# Patient Record
Sex: FEMALE | Race: WHITE | NOT HISPANIC OR LATINO | Employment: FULL TIME | URBAN - METROPOLITAN AREA
[De-identification: names, ages, dates, MRNs, and addresses within clinical notes are randomized per-mention and may not be internally consistent; named-entity substitution may affect disease eponyms.]

---

## 2021-03-26 ENCOUNTER — TRANSFERRED RECORDS (OUTPATIENT)
Dept: HEALTH INFORMATION MANAGEMENT | Facility: CLINIC | Age: 51
End: 2021-03-26

## 2021-03-26 ENCOUNTER — TELEPHONE (OUTPATIENT)
Dept: ORTHOPEDICS | Facility: CLINIC | Age: 51
End: 2021-03-26

## 2021-03-26 NOTE — TELEPHONE ENCOUNTER
[facility] Call [facility]    Phone Message    May a detailed message be left on voicemail: yes     Reason for Call: Other: Patient is being referred to see  for a tumor appt. Dr. Mikayla Coelho from [facility] is getting records sent over. But patient is requesting a call back to set up an appt with .      Action Taken: Other: ORTHO    Travel Screening: Not Applicable

## 2021-03-30 ENCOUNTER — DOCUMENTATION ONLY (OUTPATIENT)
Dept: CARE COORDINATION | Facility: CLINIC | Age: 51
End: 2021-03-30

## 2021-03-30 NOTE — TELEPHONE ENCOUNTER
RECORDS RECEIVED FROM: Paraspinal Mass Referring: Cassy Coelho @ [facility]   Walked into clinic to schedule  *Records w/Sofy HERRERA CMA in Clinic/CE*  *MRI in PACS, Need Chest/Abd./Pelvis CT completed on 02/07/2021 @ [facility]*   DATE RECEIVED: Apr 1, 2021   NOTES STATUS DETAILS   OFFICE NOTE from referring provider Received    OFFICE NOTE from other specialist N/A    DISCHARGE SUMMARY from hospital N/A    DISCHARGE REPORT from the ER N/A    OPERATIVE REPORT N/A    MEDICATION LIST Internal    IMPLANT RECORD/STICKER N/A    LABS     CBC/DIFF N/A    CULTURES N/A    INJECTIONS DONE IN RADIOLOGY N/A    MRI Received    CT SCAN In process    XRAYS (IMAGES & REPORTS) In process    TUMOR     PATHOLOGY  Slides & report N/A    03/30/21   11:40 AM   RESOLVED IMAGE IN PACS  COMPLETE  Cailin Santa CMA    03/30/21   10:51 AM   CALLED YOHANNES FOR CT  Cailin Santa CMA

## 2021-03-30 NOTE — TELEPHONE ENCOUNTER
Reached out to patient on 03/29/2021 after she came to the clinic to schedule an appointment for Ortho Onc. Consult. I apologized that I was unable to meet with her when she came to the clinic and explained that I had just finished a busy clinic. She verbalized understanding and was just happy to hear back about moving forward with an appointment after being referred for a Paraspinal Mass. She verbalized that she is very anxious and wanted to make sure that we received all of her records/imaging from the referring. I explained that I could see everything in CE, and that I had also printed the imaging reports. I assured her that her MRI was here and that the only other piece of information we needed was her CAP CT done on 02/07/2021, but that our records team will take care of getting it. She was very relieved. When she came to the clinic she was scheduled for Monday, 04/05/2021 w/Dr. Mao. She asked if there was anything sooner. I offered her an appointment with Dr. Gu on Thursday, 04/01/2021 @ 2pm. She decided to keep her original appointment, but to be seen by Dr. Gu first so that she could be seen ASAP. She had no further questions or concerns and thanked me for my help.

## 2021-04-01 ENCOUNTER — PRE VISIT (OUTPATIENT)
Dept: ORTHOPEDICS | Facility: CLINIC | Age: 51
End: 2021-04-01

## 2021-04-01 ENCOUNTER — OFFICE VISIT (OUTPATIENT)
Dept: ORTHOPEDICS | Facility: CLINIC | Age: 51
End: 2021-04-01
Payer: COMMERCIAL

## 2021-04-01 DIAGNOSIS — M72.9 FIBROMATOSIS: Primary | ICD-10-CM

## 2021-04-01 PROCEDURE — 99203 OFFICE O/P NEW LOW 30 MIN: CPT | Performed by: ORTHOPAEDIC SURGERY

## 2021-04-01 RX ORDER — OXYBUTYNIN CHLORIDE 5 MG/1
5 TABLET, EXTENDED RELEASE ORAL
COMMUNITY
Start: 2019-11-18

## 2021-04-01 RX ORDER — HYDROXYZINE HYDROCHLORIDE 10 MG/1
10-20 TABLET, FILM COATED ORAL
COMMUNITY
Start: 2020-09-23

## 2021-04-01 RX ORDER — DIPHENOXYLATE HYDROCHLORIDE AND ATROPINE SULFATE 2.5; .025 MG/1; MG/1
1 TABLET ORAL
COMMUNITY

## 2021-04-01 RX ORDER — FLUOXETINE 10 MG/1
10 CAPSULE ORAL
COMMUNITY
Start: 2020-03-05

## 2021-04-01 NOTE — LETTER
4/1/2021         RE: Maday Yuan  [address]  [place] [facility] [address]        Dear Colleague,    Thank you for referring your patient, Maday Yuan, to the [facility] [place]. Please see a copy of my visit note below.    Chief complaint right lumbar paraspinal mass.    Patient is a 50-year-old female who was seen in the emergency room in early February.  This was for abdominal symptoms in the abdominal CT was obtained.  A mass in the right lumbar paraspinal region was identified.  This was ultimately followed up with an MRI scan which characterize the mass has been 3.8 x 2.7 x 14 cm.  There was some impression by the radiologist that it had enlarged since the February CT scan.    Her past medical history and medications are not contributing to this evaluation and are listed in her electronic record.    On physical exam examination of the paraspinal region on the right both while standing and while prone does not elicit an obvious mass.    Review of the CT scan and the MRI scan confirmed the finding of a paraspinal mass.  This is not diagnosable by MRI.  It is very infiltrating the paraspinal muscles and to my review may represent a fibromatosis lesion.    I reviewed with the patient the need for biopsy.  I feel an open biopsy would be best because it still it is difficult to identify the tumor on physical examination which makes a Ke-Cut biopsy is less reliable.    All her questions were answered and she would like to proceed with an open biopsy.    Impression: Right lumbar paraspinal mass which warrants biopsy.  Open biopsy recommended.    Plan: 1.  Kenya will contact the patient to arrange for the biopsy.  2.  The case request form has been completed.    Significant time was spent reviewing both the MRI scan and the CT scan myself.  The patient and her  had many questions.  Total length of visit was 35 minutes including my review of imaging prior to and during the  visit.        Malick Gu MD

## 2021-04-01 NOTE — NURSING NOTE
Chief Complaint   Patient presents with     Consult     consulting paraspinal mass referred by Cassy Coelho NP at [facility]        50 year old  1970    There were no vitals taken for this visit.            Pain Assessment  Patient Currently in Pain: Yes  0-10 Pain Scale: 3  Primary Pain Location: Back(lower back)             [id number] - [place], [place] - [address]        Allergies   Allergen Reactions     Cyclobenzaprine Other (See Comments)     Makes her hyper  Makes her hyper             Current Outpatient Medications   Medication     FLUoxetine (PROZAC) 10 MG capsule     FLUoxetine (PROZAC) 20 MG capsule     hydrOXYzine (ATARAX) 10 MG tablet     melatonin 1 MG TABS tablet     Multiple Vitamin (MULTI-VITAMINS) TABS     oxybutynin ER (DITROPAN-XL) 5 MG 24 hr tablet     No current facility-administered medications for this visit.

## 2021-04-02 ENCOUNTER — PREP FOR PROCEDURE (OUTPATIENT)
Dept: ORTHOPEDICS | Facility: CLINIC | Age: 51
End: 2021-04-02

## 2021-04-02 DIAGNOSIS — M72.9 FIBROMATOSIS: Primary | ICD-10-CM

## 2021-04-02 NOTE — PROGRESS NOTES
Chief complaint right lumbar paraspinal mass.    Patient is a 50-year-old female who was seen in the emergency room in early February.  This was for abdominal symptoms in the abdominal CT was obtained.  A mass in the right lumbar paraspinal region was identified.  This was ultimately followed up with an MRI scan which characterize the mass has been 3.8 x 2.7 x 14 cm.  There was some impression by the radiologist that it had enlarged since the February CT scan.    Her past medical history and medications are not contributing to this evaluation and are listed in her electronic record.    On physical exam examination of the paraspinal region on the right both while standing and while prone does not elicit an obvious mass.    Review of the CT scan and the MRI scan confirmed the finding of a paraspinal mass.  This is not diagnosable by MRI.  It is very infiltrating the paraspinal muscles and to my review may represent a fibromatosis lesion.    I reviewed with the patient the need for biopsy.  I feel an open biopsy would be best because it still it is difficult to identify the tumor on physical examination which makes a Ke-Cut biopsy is less reliable.    All her questions were answered and she would like to proceed with an open biopsy.    Impression: Right lumbar paraspinal mass which warrants biopsy.  Open biopsy recommended.    Plan: 1.  Kenya will contact the patient to arrange for the biopsy.  2.  The case request form has been completed.    Significant time was spent reviewing both the MRI scan and the CT scan myself.  The patient and her  had many questions.  Total length of visit was 35 minutes including my review of imaging prior to and during the visit.

## 2021-04-05 ENCOUNTER — HOSPITAL ENCOUNTER (OUTPATIENT)
Facility: AMBULATORY SURGERY CENTER | Age: 51
End: 2021-04-05
Attending: ORTHOPAEDIC SURGERY
Payer: COMMERCIAL

## 2021-04-05 ENCOUNTER — TELEPHONE (OUTPATIENT)
Dept: ORTHOPEDICS | Facility: CLINIC | Age: 51
End: 2021-04-05

## 2021-04-05 DIAGNOSIS — M72.9 FIBROMATOSIS: ICD-10-CM

## 2021-04-05 NOTE — TELEPHONE ENCOUNTER
Patient is scheduled for surgery with Dr. Gu    Spoke with: Spoke with Maday    Date of Surgery: 4/23/21    Location: [facility]    Informed patient they will need an adult  Yes    Pre-op with surgeon (if applicable): Complete    H&P: Patient to schedule with PCP    Pre-procedure COVID-19 Test: Patient will await call from covid scheduling team    Additional imaging/appointments: post ops made    Surgery packet: mailed 4/5/21     Additional comments: Patient will await call from pre op nurses 1-2 days prior to surgery for arrival time

## 2021-04-05 NOTE — TELEPHONE ENCOUNTER
RN returned all to Maday.  She states that she is scared that Dr. Gu does not know what this is.  She is scared to wait 2 weeks, being a tier 1 surgery.  She is asking for a sooner date.  She also asked about Dr. Mao as well as that is who she was originally scheduled with and then was able to get in earlier with Dr. Gu.  RN assured her that Tier 1 2 weeks was an acceptable time frame for surgery.  She needed to get her H&P scheduled as well for surgery.  RN will review with MD the OR timing and get back to her tomorrow.  She knows that it will be after 5 pm probably before I call.  She states that she is just nervous .

## 2021-04-05 NOTE — TELEPHONE ENCOUNTER
[facility] Call [facility]    Phone Message    May a detailed message be left on voicemail: yes     Reason for Call: Other: Pt would like a call back to schedule biospy as ordered by Dr. Gu (4/2). She would like to schedule as soon as possible     Action Taken: Message routed to:  [facility]o    Travel Screening: Not Applicable

## 2021-04-06 ENCOUNTER — TRANSFERRED RECORDS (OUTPATIENT)
Dept: HEALTH INFORMATION MANAGEMENT | Facility: CLINIC | Age: 51
End: 2021-04-06

## 2021-04-06 ENCOUNTER — TELEPHONE (OUTPATIENT)
Dept: ORTHOPEDICS | Facility: CLINIC | Age: 51
End: 2021-04-06

## 2021-04-06 NOTE — TELEPHONE ENCOUNTER
[facility] Call [facility]    Phone Message    May a detailed message be left on voicemail: yes     Reason for Call: Other: Patient is scheduled for a biopsy with Dr. Gu on 4/23/21 and Pratima wanted to know if patient can be seen sooner with another provider who would be appropriate for this?      Please call her to discuss.    Action Taken: Message routed to:  [facility] ortho    Travel Screening: Not Applicable

## 2021-04-07 ENCOUNTER — TELEPHONE (OUTPATIENT)
Dept: ORTHOPEDICS | Facility: CLINIC | Age: 51
End: 2021-04-07

## 2021-04-07 DIAGNOSIS — Z11.59 ENCOUNTER FOR SCREENING FOR OTHER VIRAL DISEASES: ICD-10-CM

## 2021-04-07 RX ORDER — CEFAZOLIN SODIUM 2 G/50ML
2 SOLUTION INTRAVENOUS
Status: CANCELLED | OUTPATIENT
Start: 2021-04-07

## 2021-04-07 RX ORDER — CEFAZOLIN SODIUM 2 G/50ML
2 SOLUTION INTRAVENOUS SEE ADMIN INSTRUCTIONS
Status: CANCELLED | OUTPATIENT
Start: 2021-04-07

## 2021-04-07 NOTE — TELEPHONE ENCOUNTER
RN reviewed with patient the surgery packet and answered all her questions.  She had her H&P yesterday.  She is aware of covid and PAN calls coming yet.  RN discussed showering and soap, capnography,.  She is going to have her spouse Bill take care of her after surgery and drive her.  Medications were discussed with PCP and H&P.  RN states that 7-10 days is the normal turn around time for pathology.  Post op appt already on the books.

## 2021-04-07 NOTE — TELEPHONE ENCOUNTER
----- Message from Hailey Newsome sent at 4/7/2021  8:15 AM CDT -----  Regarding: RE: Surgery biopsy  I have her moved!  ----- Message -----  From: Malick Gu MD  Sent: 4/6/2021   5:31 PM CDT  To: Kenya Jacques RN, Hailey Newsome  Subject: Surgery biopsy                                   I just spoke with Maday.  Please schedule her for April 19 at [facility].  Thank you

## 2021-04-13 ENCOUNTER — ANESTHESIA EVENT (OUTPATIENT)
Dept: SURGERY | Facility: CLINIC | Age: 51
End: 2021-04-13
Payer: COMMERCIAL

## 2021-04-15 DIAGNOSIS — Z11.59 ENCOUNTER FOR SCREENING FOR OTHER VIRAL DISEASES: ICD-10-CM

## 2021-04-15 LAB
SARS-COV-2 RNA RESP QL NAA+PROBE: NORMAL
SPECIMEN SOURCE: NORMAL

## 2021-04-15 PROCEDURE — U0005 INFEC AGEN DETEC AMPLI PROBE: HCPCS | Performed by: ORTHOPAEDIC SURGERY

## 2021-04-15 PROCEDURE — U0003 INFECTIOUS AGENT DETECTION BY NUCLEIC ACID (DNA OR RNA); SEVERE ACUTE RESPIRATORY SYNDROME CORONAVIRUS 2 (SARS-COV-2) (CORONAVIRUS DISEASE [COVID-19]), AMPLIFIED PROBE TECHNIQUE, MAKING USE OF HIGH THROUGHPUT TECHNOLOGIES AS DESCRIBED BY CMS-2020-01-R: HCPCS | Performed by: ORTHOPAEDIC SURGERY

## 2021-04-16 LAB
LABORATORY COMMENT REPORT: NORMAL
SARS-COV-2 RNA RESP QL NAA+PROBE: NEGATIVE
SPECIMEN SOURCE: NORMAL

## 2021-04-19 ENCOUNTER — ANESTHESIA (OUTPATIENT)
Dept: SURGERY | Facility: CLINIC | Age: 51
End: 2021-04-19
Payer: COMMERCIAL

## 2021-04-19 ENCOUNTER — HOSPITAL ENCOUNTER (OUTPATIENT)
Facility: CLINIC | Age: 51
Discharge: HOME | End: 2021-04-19
Attending: ORTHOPAEDIC SURGERY | Admitting: ORTHOPAEDIC SURGERY
Payer: COMMERCIAL

## 2021-04-19 VITALS
HEIGHT: 66 IN | DIASTOLIC BLOOD PRESSURE: 78 MMHG | TEMPERATURE: 98.2 F | WEIGHT: 181.22 LBS | HEART RATE: 70 BPM | OXYGEN SATURATION: 97 % | SYSTOLIC BLOOD PRESSURE: 115 MMHG | RESPIRATION RATE: 16 BRPM | BODY MASS INDEX: 29.12 KG/M2

## 2021-04-19 DIAGNOSIS — M72.9 FIBROMATOSIS: ICD-10-CM

## 2021-04-19 LAB — GLUCOSE BLDC GLUCOMTR-MCNC: 97 MG/DL (ref 70–99)

## 2021-04-19 PROCEDURE — 250N000013 HC RX MED GY IP 250 OP 250 PS 637: Performed by: PHYSICIAN ASSISTANT

## 2021-04-19 PROCEDURE — 370N000017 HC ANESTHESIA TECHNICAL FEE, PER MIN: Performed by: ORTHOPAEDIC SURGERY

## 2021-04-19 PROCEDURE — 250N000013 HC RX MED GY IP 250 OP 250 PS 637: Performed by: ANESTHESIOLOGY

## 2021-04-19 PROCEDURE — 250N000025 HC SEVOFLURANE, PER MIN: Performed by: ORTHOPAEDIC SURGERY

## 2021-04-19 PROCEDURE — 250N000011 HC RX IP 250 OP 636: Performed by: NURSE ANESTHETIST, CERTIFIED REGISTERED

## 2021-04-19 PROCEDURE — 250N000009 HC RX 250: Performed by: NURSE ANESTHETIST, CERTIFIED REGISTERED

## 2021-04-19 PROCEDURE — 88307 TISSUE EXAM BY PATHOLOGIST: CPT | Mod: 26 | Performed by: PATHOLOGY

## 2021-04-19 PROCEDURE — 88307 TISSUE EXAM BY PATHOLOGIST: CPT | Mod: TC | Performed by: ORTHOPAEDIC SURGERY

## 2021-04-19 PROCEDURE — 250N000011 HC RX IP 250 OP 636: Performed by: PHYSICIAN ASSISTANT

## 2021-04-19 PROCEDURE — 710N000010 HC RECOVERY PHASE 1, LEVEL 2, PER MIN: Performed by: ORTHOPAEDIC SURGERY

## 2021-04-19 PROCEDURE — 82962 GLUCOSE BLOOD TEST: CPT

## 2021-04-19 PROCEDURE — 710N000012 HC RECOVERY PHASE 2, PER MINUTE: Performed by: ORTHOPAEDIC SURGERY

## 2021-04-19 PROCEDURE — 360N000077 HC SURGERY LEVEL 4, PER MIN: Performed by: ORTHOPAEDIC SURGERY

## 2021-04-19 PROCEDURE — 272N000001 HC OR GENERAL SUPPLY STERILE: Performed by: ORTHOPAEDIC SURGERY

## 2021-04-19 PROCEDURE — 258N000003 HC RX IP 258 OP 636: Performed by: ANESTHESIOLOGY

## 2021-04-19 PROCEDURE — 999N000141 HC STATISTIC PRE-PROCEDURE NURSING ASSESSMENT: Performed by: ORTHOPAEDIC SURGERY

## 2021-04-19 PROCEDURE — 250N000009 HC RX 250: Performed by: ORTHOPAEDIC SURGERY

## 2021-04-19 PROCEDURE — 250N000011 HC RX IP 250 OP 636: Performed by: ANESTHESIOLOGY

## 2021-04-19 RX ORDER — ONDANSETRON 4 MG/1
4 TABLET, ORALLY DISINTEGRATING ORAL EVERY 30 MIN PRN
Status: DISCONTINUED | OUTPATIENT
Start: 2021-04-19 | End: 2021-04-19 | Stop reason: HOSPADM

## 2021-04-19 RX ORDER — ACETAMINOPHEN 500 MG
1000 TABLET ORAL ONCE
Status: COMPLETED | OUTPATIENT
Start: 2021-04-19 | End: 2021-04-19

## 2021-04-19 RX ORDER — CEFAZOLIN SODIUM 2 G/100ML
2 INJECTION, SOLUTION INTRAVENOUS SEE ADMIN INSTRUCTIONS
Status: DISCONTINUED | OUTPATIENT
Start: 2021-04-19 | End: 2021-04-19 | Stop reason: HOSPADM

## 2021-04-19 RX ORDER — ONDANSETRON 2 MG/ML
4 INJECTION INTRAMUSCULAR; INTRAVENOUS EVERY 30 MIN PRN
Status: DISCONTINUED | OUTPATIENT
Start: 2021-04-19 | End: 2021-04-19 | Stop reason: HOSPADM

## 2021-04-19 RX ORDER — FENTANYL CITRATE 50 UG/ML
25-50 INJECTION, SOLUTION INTRAMUSCULAR; INTRAVENOUS
Status: DISCONTINUED | OUTPATIENT
Start: 2021-04-19 | End: 2021-04-19 | Stop reason: HOSPADM

## 2021-04-19 RX ORDER — MEPERIDINE HYDROCHLORIDE 25 MG/ML
12.5 INJECTION INTRAMUSCULAR; INTRAVENOUS; SUBCUTANEOUS
Status: COMPLETED | OUTPATIENT
Start: 2021-04-19 | End: 2021-04-19

## 2021-04-19 RX ORDER — OXYCODONE HYDROCHLORIDE 5 MG/1
5 TABLET ORAL
Status: COMPLETED | OUTPATIENT
Start: 2021-04-19 | End: 2021-04-19

## 2021-04-19 RX ORDER — AMOXICILLIN 250 MG
1-2 CAPSULE ORAL 2 TIMES DAILY
Qty: 30 TABLET | Refills: 0 | Status: SHIPPED | OUTPATIENT
Start: 2021-04-19

## 2021-04-19 RX ORDER — HYDROMORPHONE HYDROCHLORIDE 1 MG/ML
.3-.5 INJECTION, SOLUTION INTRAMUSCULAR; INTRAVENOUS; SUBCUTANEOUS EVERY 5 MIN PRN
Status: DISCONTINUED | OUTPATIENT
Start: 2021-04-19 | End: 2021-04-19 | Stop reason: HOSPADM

## 2021-04-19 RX ORDER — ONDANSETRON 4 MG/1
4 TABLET, ORALLY DISINTEGRATING ORAL
Status: DISCONTINUED | OUTPATIENT
Start: 2021-04-19 | End: 2021-04-19 | Stop reason: HOSPADM

## 2021-04-19 RX ORDER — MAGNESIUM HYDROXIDE 1200 MG/15ML
LIQUID ORAL PRN
Status: DISCONTINUED | OUTPATIENT
Start: 2021-04-19 | End: 2021-04-19 | Stop reason: HOSPADM

## 2021-04-19 RX ORDER — SODIUM CHLORIDE, SODIUM LACTATE, POTASSIUM CHLORIDE, CALCIUM CHLORIDE 600; 310; 30; 20 MG/100ML; MG/100ML; MG/100ML; MG/100ML
INJECTION, SOLUTION INTRAVENOUS CONTINUOUS
Status: DISCONTINUED | OUTPATIENT
Start: 2021-04-19 | End: 2021-04-19

## 2021-04-19 RX ORDER — NALOXONE HYDROCHLORIDE 0.4 MG/ML
0.4 INJECTION, SOLUTION INTRAMUSCULAR; INTRAVENOUS; SUBCUTANEOUS
Status: DISCONTINUED | OUTPATIENT
Start: 2021-04-19 | End: 2021-04-19

## 2021-04-19 RX ORDER — ONDANSETRON 2 MG/ML
4 INJECTION INTRAMUSCULAR; INTRAVENOUS EVERY 30 MIN PRN
Status: DISCONTINUED | OUTPATIENT
Start: 2021-04-19 | End: 2021-04-19

## 2021-04-19 RX ORDER — HYDROMORPHONE HYDROCHLORIDE 1 MG/ML
0.2 INJECTION, SOLUTION INTRAMUSCULAR; INTRAVENOUS; SUBCUTANEOUS EVERY 5 MIN PRN
Status: DISCONTINUED | OUTPATIENT
Start: 2021-04-19 | End: 2021-04-19 | Stop reason: HOSPADM

## 2021-04-19 RX ORDER — LABETALOL HYDROCHLORIDE 5 MG/ML
5 INJECTION, SOLUTION INTRAVENOUS
Status: DISCONTINUED | OUTPATIENT
Start: 2021-04-19 | End: 2021-04-19 | Stop reason: HOSPADM

## 2021-04-19 RX ORDER — LIDOCAINE HYDROCHLORIDE 20 MG/ML
INJECTION, SOLUTION INFILTRATION; PERINEURAL PRN
Status: DISCONTINUED | OUTPATIENT
Start: 2021-04-19 | End: 2021-04-19

## 2021-04-19 RX ORDER — SODIUM CHLORIDE, SODIUM LACTATE, POTASSIUM CHLORIDE, CALCIUM CHLORIDE 600; 310; 30; 20 MG/100ML; MG/100ML; MG/100ML; MG/100ML
INJECTION, SOLUTION INTRAVENOUS CONTINUOUS
Status: DISCONTINUED | OUTPATIENT
Start: 2021-04-19 | End: 2021-04-19 | Stop reason: HOSPADM

## 2021-04-19 RX ORDER — ACETAMINOPHEN 325 MG/1
650 TABLET ORAL
Status: DISCONTINUED | OUTPATIENT
Start: 2021-04-19 | End: 2021-04-19 | Stop reason: HOSPADM

## 2021-04-19 RX ORDER — NALOXONE HYDROCHLORIDE 0.4 MG/ML
0.2 INJECTION, SOLUTION INTRAMUSCULAR; INTRAVENOUS; SUBCUTANEOUS
Status: DISCONTINUED | OUTPATIENT
Start: 2021-04-19 | End: 2021-04-19 | Stop reason: HOSPADM

## 2021-04-19 RX ORDER — NALOXONE HYDROCHLORIDE 0.4 MG/ML
0.2 INJECTION, SOLUTION INTRAMUSCULAR; INTRAVENOUS; SUBCUTANEOUS
Status: DISCONTINUED | OUTPATIENT
Start: 2021-04-19 | End: 2021-04-19

## 2021-04-19 RX ORDER — FENTANYL CITRATE 50 UG/ML
INJECTION, SOLUTION INTRAMUSCULAR; INTRAVENOUS PRN
Status: DISCONTINUED | OUTPATIENT
Start: 2021-04-19 | End: 2021-04-19

## 2021-04-19 RX ORDER — ONDANSETRON 4 MG/1
4 TABLET, ORALLY DISINTEGRATING ORAL EVERY 30 MIN PRN
Status: DISCONTINUED | OUTPATIENT
Start: 2021-04-19 | End: 2021-04-19

## 2021-04-19 RX ORDER — NALOXONE HYDROCHLORIDE 0.4 MG/ML
0.4 INJECTION, SOLUTION INTRAMUSCULAR; INTRAVENOUS; SUBCUTANEOUS
Status: DISCONTINUED | OUTPATIENT
Start: 2021-04-19 | End: 2021-04-19 | Stop reason: HOSPADM

## 2021-04-19 RX ORDER — OXYCODONE HYDROCHLORIDE 5 MG/1
5 TABLET ORAL EVERY 4 HOURS PRN
Status: DISCONTINUED | OUTPATIENT
Start: 2021-04-19 | End: 2021-04-19 | Stop reason: HOSPADM

## 2021-04-19 RX ORDER — CEFAZOLIN SODIUM 2 G/100ML
2 INJECTION, SOLUTION INTRAVENOUS
Status: COMPLETED | OUTPATIENT
Start: 2021-04-19 | End: 2021-04-19

## 2021-04-19 RX ORDER — LIDOCAINE 40 MG/G
CREAM TOPICAL
Status: DISCONTINUED | OUTPATIENT
Start: 2021-04-19 | End: 2021-04-19 | Stop reason: HOSPADM

## 2021-04-19 RX ORDER — BUPIVACAINE HYDROCHLORIDE AND EPINEPHRINE 5; 5 MG/ML; UG/ML
INJECTION, SOLUTION PERINEURAL PRN
Status: DISCONTINUED | OUTPATIENT
Start: 2021-04-19 | End: 2021-04-19 | Stop reason: HOSPADM

## 2021-04-19 RX ORDER — PROPOFOL 10 MG/ML
INJECTION, EMULSION INTRAVENOUS PRN
Status: DISCONTINUED | OUTPATIENT
Start: 2021-04-19 | End: 2021-04-19

## 2021-04-19 RX ORDER — ONDANSETRON 2 MG/ML
INJECTION INTRAMUSCULAR; INTRAVENOUS PRN
Status: DISCONTINUED | OUTPATIENT
Start: 2021-04-19 | End: 2021-04-19

## 2021-04-19 RX ORDER — OXYCODONE HYDROCHLORIDE 5 MG/1
5-10 TABLET ORAL EVERY 4 HOURS PRN
Qty: 10 TABLET | Refills: 0 | Status: SHIPPED | OUTPATIENT
Start: 2021-04-19 | End: 2021-04-20

## 2021-04-19 RX ORDER — KETOROLAC TROMETHAMINE 30 MG/ML
INJECTION, SOLUTION INTRAMUSCULAR; INTRAVENOUS PRN
Status: DISCONTINUED | OUTPATIENT
Start: 2021-04-19 | End: 2021-04-19

## 2021-04-19 RX ADMIN — ROCURONIUM BROMIDE 50 MG: 10 INJECTION INTRAVENOUS at 12:35

## 2021-04-19 RX ADMIN — OXYCODONE HYDROCHLORIDE 5 MG: 5 TABLET ORAL at 15:33

## 2021-04-19 RX ADMIN — SODIUM CHLORIDE, POTASSIUM CHLORIDE, SODIUM LACTATE AND CALCIUM CHLORIDE: 600; 310; 30; 20 INJECTION, SOLUTION INTRAVENOUS at 12:26

## 2021-04-19 RX ADMIN — CEFAZOLIN 2 G: 10 INJECTION, POWDER, FOR SOLUTION INTRAVENOUS at 12:49

## 2021-04-19 RX ADMIN — MEPERIDINE HYDROCHLORIDE 12.5 MG: 25 INJECTION INTRAMUSCULAR; INTRAVENOUS; SUBCUTANEOUS at 14:02

## 2021-04-19 RX ADMIN — SUGAMMADEX 200 MG: 100 INJECTION, SOLUTION INTRAVENOUS at 13:50

## 2021-04-19 RX ADMIN — ONDANSETRON 4 MG: 2 INJECTION INTRAMUSCULAR; INTRAVENOUS at 12:58

## 2021-04-19 RX ADMIN — ACETAMINOPHEN 1000 MG: 500 TABLET, FILM COATED ORAL at 14:23

## 2021-04-19 RX ADMIN — KETOROLAC TROMETHAMINE 30 MG: 30 INJECTION, SOLUTION INTRAMUSCULAR at 13:24

## 2021-04-19 RX ADMIN — PROPOFOL 100 MG: 10 INJECTION, EMULSION INTRAVENOUS at 12:33

## 2021-04-19 RX ADMIN — LIDOCAINE HYDROCHLORIDE 100 MG: 20 INJECTION, SOLUTION INFILTRATION; PERINEURAL at 12:31

## 2021-04-19 RX ADMIN — MIDAZOLAM 2 MG: 1 INJECTION INTRAMUSCULAR; INTRAVENOUS at 12:26

## 2021-04-19 RX ADMIN — FENTANYL CITRATE 100 MCG: 50 INJECTION, SOLUTION INTRAMUSCULAR; INTRAVENOUS at 12:31

## 2021-04-19 RX ADMIN — MEPERIDINE HYDROCHLORIDE 12.5 MG: 25 INJECTION INTRAMUSCULAR; INTRAVENOUS; SUBCUTANEOUS at 14:09

## 2021-04-19 ASSESSMENT — MIFFLIN-ST. JEOR: SCORE: 1468.75

## 2021-04-19 NOTE — OP NOTE
preop diagnosis: Suspect tumor right lumbar paraspinal region.      Postoperative diagnosis: No gross evidence of tumor though microscopic tumor could be present so biopsy segment of paraspinal tissue.    Surgeon: Danish Gu and ANGELINE Garcia.  Ms. Watson's assistance was required throughout the case for retraction and assistance with hemostasis.    Pathology submitted: Tumor right paraspinal region please cut inspect tire specimen.    Estimated blood loss: Less than 5 cc.    Patient was interviewed in the preoperative area with her .  She has had an serial images including a abdominal CT and MRI scan demonstrating an abnormality in the right lumbar paraspinal region which which was thought to represent some type of neoplasm.  Most recent MRI scan was March 26, 2021.  A biopsy for diagnosis was recommended as the tumor was nonpalpable in the clinic.    Risk and benefits have been reviewed previously.  Consent was signed and the surgical site was marked with my initials and line of intended incision.    Preoperative briefing been performed.  The patient was taken the operating room was placed in a prone position the right paraspinal area was prepped and draped sterilely.    Preoperative planning demonstrated that the central portion of the 9 cm lesion was 6 cm above the proximal aspect of the iliac crest.  The area of suspected tumor was palpated.  There was no obvious tumor present.  A 3 cm incision was then made centered over 6 cm above the iliac crest.  Cautery dissection was taken down to and through the paraspinal muscles.  There is no easily identifiable tumor or abnormality.  For this reason the incision was then extended to measure approximately 8 cm.  Spanning the at risk area for tumor.  Palpation of the paraspinal muscles did not reveal any obvious tumor though there was a suspected nodule.  This suspicious area was excised circumferentially.  The rest of the paraspinal muscles were  exposed explored with direct visualization and dissection between the muscle fibers.  No obvious abnormality was identified.  Given this finding it is possible that our biopsy will not reveal diagnosis and that the findings on imaging were representing some normal process within the muscles that had resolved such as myositis or some other type of transient neoplastic right condition.    The wound was irrigated and closed in a standard fashion.    Postoperative debrief was performed.    Postoperative plan: 1.  Contact the patient with the final histopathology.  2.  See the patient back in 2 weeks for a face-to-face visit.

## 2021-04-19 NOTE — ANESTHESIA POSTPROCEDURE EVALUATION
Patient: Maday Yuan    Procedure(s):  biopsy right lumbar paraspinal tumor    Diagnosis:Fibromatosis [M72.9]  Diagnosis Additional Information: No value filed.    Anesthesia Type:  General    Note:     Postop Pain Control: Uneventful            Sign Out: Well controlled pain   PONV:    Neuro/Psych: Uneventful            Sign Out: Acceptable/Baseline neuro status   Airway/Respiratory: Uneventful            Sign Out: Acceptable/Baseline resp. status   CV/Hemodynamics: Uneventful            Sign Out: Acceptable CV status   Other NRE: NONE   DID A NON-ROUTINE EVENT OCCUR?          Last vitals:  Vitals:    04/19/21 0947 04/19/21 1400   BP: (!) 130/92 (!) 150/90   Pulse: 76 85   Resp: 20 16   Temp: 37.1  C (98.7  F) 36.6  C (97.9  F)   SpO2: 99% 100%       Last vitals prior to Anesthesia Care Transfer:  CRNA VITALS  4/19/2021 1326 - 4/19/2021 1426      4/19/2021             NIBP:  144/80    Pulse:  84    NIBP Mean:  113    SpO2:  100 %          Electronically Signed By: Miguel Angel Drake DO  April 19, 2021  2:29 PM

## 2021-04-19 NOTE — BRIEF OP NOTE
[facility]    Brief Operative Note    Pre-operative diagnosis: Fibromatosis [M72.9]  Post-operative diagnosis Same as pre-operative diagnosis    Procedure: Procedure(s):  biopsy right lumbar paraspinal tumor  Surgeon: Surgeon(s) and Role:     * Malick Gu MD - Primary  Anesthesia: General   Estimated blood loss: Less than 10 ml  Drains: None  Specimens:   ID Type Source Tests Collected by Time Destination   A : Tumor- Right Lumbar Paraspinal Tissue Spine, Lumbar SURGICAL PATHOLOGY EXAM Malick Gu MD 4/19/2021  1:11 PM      Findings:   None.  Complications: None.  Implants: * No implants in log *       Aquaecel dressing, can stay in place for 4 days. Weight bearing as tolerated. No activity restrictions. Follow up in 2 weeks with Dr. Gu.

## 2021-04-19 NOTE — ANESTHESIA PREPROCEDURE EVALUATION
Anesthesia Pre-Procedure Evaluation    Patient: Maday Yuan   MRN: [id number] : 1970        Preoperative Diagnosis: Fibromatosis [M72.9]   Procedure : Procedure(s):  biopsy right lumbar paraspinal tumor     History reviewed. No pertinent past medical history.   History reviewed. No pertinent surgical history.   Allergies   Allergen Reactions     Cyclobenzaprine Other (See Comments)     Makes her hyper  Makes her hyper        Social History     Tobacco Use     Smoking status: Never Smoker     Smokeless tobacco: Never Used   Substance Use Topics     Alcohol use: Yes     Comment: socially      Wt Readings from Last 1 Encounters:   No data found for Wt        Anesthesia Evaluation            ROS/MED HX  ENT/Pulmonary:  - neg pulmonary ROS     Neurologic:  - neg neurologic ROS     Cardiovascular:  - neg cardiovascular ROS     METS/Exercise Tolerance:     Hematologic:     (+) History of blood clots,     Musculoskeletal: Comment: paraspinous muscle mass      GI/Hepatic:  - neg GI/hepatic ROS     Renal/Genitourinary:  - neg Renal ROS     Endo:  - neg endo ROS     Psychiatric/Substance Use:  - neg psychiatric ROS     Infectious Disease:  - neg infectious disease ROS     Malignancy:  - neg malignancy ROS     Other:            Physical Exam    Airway        Mallampati: II   TM distance: > 3 FB   Neck ROM: full   Mouth opening: > 3 cm    Respiratory Devices and Support         Dental  no notable dental history         Cardiovascular          Rhythm and rate: regular and normal     Pulmonary           breath sounds clear to auscultation           OUTSIDE LABS:  CBC: No results found for: WBC, HGB, HCT, PLT  BMP: No results found for: NA, POTASSIUM, CHLORIDE, CO2, BUN, CR, GLC  COAGS: No results found for: PTT, INR, FIBR  POC: No results found for: BGM, HCG, HCGS  HEPATIC: No results found for: ALBUMIN, PROTTOTAL, ALT, AST, GGT, ALKPHOS, BILITOTAL, BILIDIRECT, JOHN  OTHER: No results found for: PH, LACT, A1C, NESHA,  PHOS, MAG, LIPASE, AMYLASE, TSH, T4, T3, CRP, SED    Anesthesia Plan    ASA Status:  2   NPO Status:  NPO Appropriate    Anesthesia Type: General.     - Airway: ETT   Induction: Intravenous.   Maintenance: Balanced.        Consents    Anesthesia Plan(s) and associated risks, benefits, and realistic alternatives discussed. Questions answered and patient/representative(s) expressed understanding.     - Discussed with:  Patient      - Extended Intubation/Ventilatory Support Discussed: No.      - Patient is DNR/DNI Status: No    Use of blood products discussed: No .     Postoperative Care    Pain management: IV analgesics, Oral pain medications.   PONV prophylaxis: Ondansetron (or other 5HT-3)     Comments:                Sofy Mart MD

## 2021-04-19 NOTE — DISCHARGE INSTRUCTIONS
[facility]  Same-Day Surgery   Adult Discharge Orders & Instructions     For 24 hours after surgery    1. Get plenty of rest.  A responsible adult must stay with you for at least 24 hours after you leave the hospital.   2. Do not drive or use heavy equipment.  If you have weakness or tingling, don't drive or use heavy equipment until this feeling goes away.  3. Do not drink alcohol.  4. Avoid strenuous or risky activities.  Ask for help when climbing stairs.   5. You may feel lightheaded.  IF so, sit for a few minutes before standing.  Have someone help you get up.   6. If you have nausea (feel sick to your stomach): Drink only clear liquids such as apple juice, ginger ale, broth or 7-Up.  Rest may also help.  Be sure to drink enough fluids.  Move to a regular diet as you feel able.  7. You may have a slight fever. Call the doctor if your fever is over 100 F (37.7 C) (taken under the tongue) or lasts longer than 24 hours.  8. You may have a dry mouth, a sore throat, muscle aches or trouble sleeping.  These should go away after 24 hours.  9. Do not make important or legal decisions.   Call your doctor for any of the followin.  Signs of infection (fever, growing tenderness at the surgery site, a large amount of drainage or bleeding, severe pain, foul-smelling drainage, redness, swelling).    2. It has been over 8 to 10 hours since surgery and you are still not able to urinate (pass water).    3.  Headache for over 24 hours.    4.  Numbness, tingling or weakness the day after surgery (if you had spinal anesthesia).  To contact a doctor, call ________________________________________ or:        [phone] and ask for the resident on call for   ______________________________________________ (answered 24 hours a day)      Emergency Department:    [facility]: [phone]       (TTY for hearing impaired: [phone])    [facility]: [phone]       (TTY for  hearing impaired: [phone])

## 2021-04-19 NOTE — ANESTHESIA CARE TRANSFER NOTE
Patient: Maday Boaford    Procedure(s):  biopsy right lumbar paraspinal tumor    Diagnosis: Fibromatosis [M72.9]  Diagnosis Additional Information: No value filed.    Anesthesia Type:   General     Note:    Oropharynx: oropharynx clear of all foreign objects  Level of Consciousness: awake  Oxygen Supplementation: face mask  Level of Supplemental Oxygen (L/min / FiO2): 8  Independent Airway: airway patency satisfactory and stable  Dentition: dentition unchanged  Vital Signs Stable: post-procedure vital signs reviewed and stable  Report to RN Given: handoff report given  Patient transferred to: PACU    Handoff Report: Identifed the Patient, Identified the Reponsible Provider, Reviewed the pertinent medical history, Discussed the surgical course, Reviewed Intra-OP anesthesia mangement and issues during anesthesia, Set expectations for post-procedure period and Allowed opportunity for questions and acknowledgement of understanding      Vitals: (Last set prior to Anesthesia Care Transfer)  CRNA VITALS  4/19/2021 1326 - 4/19/2021 1403      4/19/2021             NIBP:  144/80    Pulse:  84    NIBP Mean:  113    SpO2:  100 %        Electronically Signed By: BRITTANEY Barney CRNA  April 19, 2021  2:03 PM

## 2021-04-20 DIAGNOSIS — M72.9 FIBROMATOSIS: ICD-10-CM

## 2021-04-20 RX ORDER — OXYCODONE HYDROCHLORIDE 5 MG/1
5-10 TABLET ORAL EVERY 6 HOURS PRN
Qty: 10 TABLET | Refills: 0 | Status: SHIPPED | OUTPATIENT
Start: 2021-04-20

## 2021-04-20 NOTE — TELEPHONE ENCOUNTER
RN called and spoke with Maday.  She is having pain with bending over.  lShe was not prepared for this long of an incision.  She needs help with getting her pants on ans things like that because bending is sharp and thobbing pain occures.  She is ok to walk.  She has been using ice and tylenol to help with the pain.  But she would like a refill as she has had to take 2 tablets today for pain.  RN will route to MD to sign.

## 2021-04-20 NOTE — TELEPHONE ENCOUNTER
[facility] Call [facility]    Phone Message    May a detailed message be left on voicemail: yes     Reason for Call: Medication Refill Request    Has the patient contacted the pharmacy for the refill? Yes   Name of medication being requested: Oxycodone   Provider who prescribed the medication: Dr. Malick Gu   Pharmacy: [facility] in [place]  Date medication is needed: 4/20/2021   Please have someone on Dr. Gu's care team reach out to the pt when this refill request has been placed.     The pt stated that Dr. Gu prescribed 10 pills. The pt spoke with a nurse who advised the pt to reach out and request 10 additional pills due to the size of the incision.       Action Taken: Message routed to:  [facility] Ortho    Travel Screening: Not Applicable

## 2021-04-21 LAB — COPATH REPORT: NORMAL

## 2021-04-27 ENCOUNTER — VIRTUAL VISIT (OUTPATIENT)
Dept: ORTHOPEDICS | Facility: CLINIC | Age: 51
End: 2021-04-27
Payer: COMMERCIAL

## 2021-04-27 DIAGNOSIS — M51.369 DDD (DEGENERATIVE DISC DISEASE), LUMBAR: Primary | ICD-10-CM

## 2021-04-27 PROCEDURE — 99024 POSTOP FOLLOW-UP VISIT: CPT | Mod: 95 | Performed by: ORTHOPAEDIC SURGERY

## 2021-04-27 NOTE — LETTER
4/27/2021         RE: Maday Yuan  [address]  [place] [facility] [address]        Dear Colleague,    Thank you for referring your patient, Maday Yuan, to the [facility] [place]. Please see a copy of my visit note below.    Diagnosis: Normal paraspinal muscle and tendon.    Treatment: Biopsy based on imaging findings of the right lumbar paraspinal region.  Negative for neoplasm.    I spoke with Maday and her  today as a postoperative visit.  She feels her wound is healed and from the perspective of her biopsy she is doing well.  She is concerned however as her pain has not improved and may be worse.  This is primarily lumbosacral discomfort.    I answered all her questions about how the imaging and CT and MR could both be abnormal but the biopsy be negative.  I emphasized that as best we can tell the good news is that there is no evidence of neoplasm.    Both Maday had her  expressed concern that their FMLA work has not been completed which is a cemented in mid April.    Impression: Back pain continues but I emphasized that I believe it is not from the area that was abnormal on the MRI which is biopsy negative.    Plan:  1.  Maday will speak with her primary care for team to identify someone to help evaluate and treat her low back discomfort.  2.  I will reach out to our team to be sure they expedite the processing of her and her 's FLMA forms.    Malick Gu MD

## 2021-04-27 NOTE — PROGRESS NOTES
Diagnosis: Normal paraspinal muscle and tendon.    Treatment: Biopsy based on imaging findings of the right lumbar paraspinal region.  Negative for neoplasm.    I spoke with Maday and her  today as a postoperative visit.  She feels her wound is healed and from the perspective of her biopsy she is doing well.  She is concerned however as her pain has not improved and may be worse.  This is primarily lumbosacral discomfort.    I answered all her questions about how the imaging and CT and MR could both be abnormal but the biopsy be negative.  I emphasized that as best we can tell the good news is that there is no evidence of neoplasm.    Both Maday had her  expressed concern that their FMLA work has not been completed which is a cemented in mid April.    Impression: Back pain continues but I emphasized that I believe it is not from the area that was abnormal on the MRI which is biopsy negative.    Plan:  1.  Maday will speak with her primary care for team to identify someone to help evaluate and treat her low back discomfort.  2.  I will reach out to our team to be sure they expedite the processing of her and her 's [employer] forms.

## 2021-04-27 NOTE — NURSING NOTE
Chief Complaint   Patient presents with     Surgical Followup     biopsy right lumbar paraspinal tumor DOS 4/19/21      Video Visit     via [employer]        50 year old  1970            Pain Assessment  Patient Currently in Pain: Yes  0-10 Pain Scale: 4  Primary Pain Location: Back(lower back)              [id number] - [place], [place] - [address] [facility]        Allergies   Allergen Reactions     Cyclobenzaprine Other (See Comments)     Makes her hyper  Makes her hyper             Current Outpatient Medications   Medication     FLUoxetine (PROZAC) 10 MG capsule     FLUoxetine (PROZAC) 20 MG capsule     hydrOXYzine (ATARAX) 10 MG tablet     melatonin 1 MG TABS tablet     Multiple Vitamin (MULTI-VITAMINS) TABS     oxybutynin ER (DITROPAN-XL) 5 MG 24 hr tablet     oxyCODONE (ROXICODONE) 5 MG tablet     senna-docusate (SENOKOT-S/PERICOLACE) 8.6-50 MG tablet     No current facility-administered medications for this visit.

## 2021-04-28 ENCOUNTER — TELEPHONE (OUTPATIENT)
Dept: ORTHOPEDICS | Facility: CLINIC | Age: 51
End: 2021-04-28

## 2021-04-28 NOTE — TELEPHONE ENCOUNTER
RN texted [employer] our Admin.  She states that the paperwork has been completed for the [facility].  RN will check with  Cindy to see if it has been faxed.

## 2021-05-09 ENCOUNTER — HEALTH MAINTENANCE LETTER (OUTPATIENT)
Age: 51
End: 2021-05-09

## 2021-05-19 ENCOUNTER — TELEPHONE (OUTPATIENT)
Dept: ORTHOPEDICS | Facility: CLINIC | Age: 51
End: 2021-05-19

## 2021-08-02 NOTE — PROVIDER NOTIFICATION
Patient having severe pain and unable to move without severe pain. Reviewed discharge meds with patient (oxycodone) and recommended to call surgeon office for further recommendations.    Samples Given: Impoyz cream Detail Level: Detailed

## 2021-10-24 ENCOUNTER — HEALTH MAINTENANCE LETTER (OUTPATIENT)
Age: 51
End: 2021-10-24

## 2022-02-13 ENCOUNTER — HEALTH MAINTENANCE LETTER (OUTPATIENT)
Age: 52
End: 2022-02-13

## 2022-06-05 ENCOUNTER — HEALTH MAINTENANCE LETTER (OUTPATIENT)
Age: 52
End: 2022-06-05

## 2022-10-15 ENCOUNTER — HEALTH MAINTENANCE LETTER (OUTPATIENT)
Age: 52
End: 2022-10-15

## 2023-03-26 ENCOUNTER — HEALTH MAINTENANCE LETTER (OUTPATIENT)
Age: 53
End: 2023-03-26

## 2023-06-11 ENCOUNTER — HEALTH MAINTENANCE LETTER (OUTPATIENT)
Age: 53
End: 2023-06-11

## (undated) DEVICE — PREP CHLORAPREP 26ML TINTED HI-LITE ORANGE 930815

## (undated) DEVICE — GLOVE PROTEXIS W/NEU-THERA 7.5  2D73TE75

## (undated) DEVICE — SOL NACL 0.9% IRRIG 1000ML BOTTLE 2F7124

## (undated) DEVICE — SU VICRYL 0 CT-1 3X27" J430T

## (undated) DEVICE — STRAP KNEE/BODY 31143004

## (undated) DEVICE — GOWN XLG DISP 9545

## (undated) DEVICE — LINEN TOWEL PACK X5 5464

## (undated) DEVICE — LINEN ORTHO PACK 5446

## (undated) DEVICE — SU VICRYL 2-0 CT-2 27" UND J269H

## (undated) DEVICE — SUCTION TIP YANKAUER STR K87

## (undated) DEVICE — LINEN GOWN X4 5410

## (undated) DEVICE — GLOVE PROTEXIS W/NEU-THERA 7.0  2D73TE70

## (undated) DEVICE — SU PDS II 3-0 PS-1 18" Z683G

## (undated) DEVICE — DRAPE LAP W/ARMBOARD 29410

## (undated) DEVICE — SOL WATER IRRIG 1000ML BOTTLE 2F7114

## (undated) DEVICE — Device

## (undated) DEVICE — DRAPE STERI TOWEL LG 1010

## (undated) DEVICE — ESU GROUND PAD UNIVERSAL W/O CORD

## (undated) DEVICE — SUCTION MANIFOLD NEPTUNE 2 SYS 4 PORT 0702-020-000

## (undated) DEVICE — GLOVE PROTEXIS BLUE W/NEU-THERA 8.0  2D73EB80

## (undated) DEVICE — GLOVE PROTEXIS BLUE W/NEU-THERA 7.5  2D73EB75

## (undated) DEVICE — DRSG AQUACEL AG 3.5X6.0" HYDROFIBER 412010

## (undated) DEVICE — TUBING SUCTION MEDI-VAC 1/4"X20' N620A

## (undated) RX ORDER — ACETAMINOPHEN 500 MG
TABLET ORAL
Status: DISPENSED
Start: 2021-04-19

## (undated) RX ORDER — KETOROLAC TROMETHAMINE 30 MG/ML
INJECTION, SOLUTION INTRAMUSCULAR; INTRAVENOUS
Status: DISPENSED
Start: 2021-04-19

## (undated) RX ORDER — FENTANYL CITRATE 50 UG/ML
INJECTION, SOLUTION INTRAMUSCULAR; INTRAVENOUS
Status: DISPENSED
Start: 2021-04-19

## (undated) RX ORDER — LIDOCAINE HYDROCHLORIDE 20 MG/ML
INJECTION, SOLUTION EPIDURAL; INFILTRATION; INTRACAUDAL; PERINEURAL
Status: DISPENSED
Start: 2021-04-19

## (undated) RX ORDER — CEFAZOLIN SODIUM 2 G/100ML
INJECTION, SOLUTION INTRAVENOUS
Status: DISPENSED
Start: 2021-04-19

## (undated) RX ORDER — OXYCODONE HYDROCHLORIDE 5 MG/1
TABLET ORAL
Status: DISPENSED
Start: 2021-04-19

## (undated) RX ORDER — MEPERIDINE HYDROCHLORIDE 25 MG/ML
INJECTION INTRAMUSCULAR; INTRAVENOUS; SUBCUTANEOUS
Status: DISPENSED
Start: 2021-04-19

## (undated) RX ORDER — BUPIVACAINE HYDROCHLORIDE AND EPINEPHRINE 5; 5 MG/ML; UG/ML
INJECTION, SOLUTION PERINEURAL
Status: DISPENSED
Start: 2021-04-19